# Patient Record
Sex: FEMALE | Race: WHITE | NOT HISPANIC OR LATINO | Employment: OTHER | URBAN - NONMETROPOLITAN AREA
[De-identification: names, ages, dates, MRNs, and addresses within clinical notes are randomized per-mention and may not be internally consistent; named-entity substitution may affect disease eponyms.]

---

## 2017-04-18 ENCOUNTER — TELEPHONE (OUTPATIENT)
Dept: CARDIOLOGY | Age: 82
End: 2017-04-18

## 2017-04-26 ENCOUNTER — TELEPHONE (OUTPATIENT)
Dept: CARDIOLOGY | Age: 82
End: 2017-04-26

## 2017-04-26 ENCOUNTER — TELEPHONE (OUTPATIENT)
Dept: FAMILY MEDICINE | Age: 82
End: 2017-04-26

## 2017-08-04 ENCOUNTER — APPOINTMENT (OUTPATIENT)
Dept: GENERAL RADIOLOGY | Facility: HOSPITAL | Age: 82
DRG: 481 | End: 2017-08-04
Attending: EMERGENCY MEDICINE
Payer: MEDICARE

## 2017-08-04 ENCOUNTER — APPOINTMENT (OUTPATIENT)
Dept: CT IMAGING | Facility: HOSPITAL | Age: 82
DRG: 481 | End: 2017-08-04
Attending: EMERGENCY MEDICINE
Payer: MEDICARE

## 2017-08-04 ENCOUNTER — HOSPITAL ENCOUNTER (INPATIENT)
Facility: HOSPITAL | Age: 82
LOS: 4 days | Discharge: NURSING, REHAB OR LONG-TERM CARE | DRG: 481 | End: 2017-08-08
Attending: EMERGENCY MEDICINE | Admitting: HOSPITALIST
Payer: MEDICARE

## 2017-08-04 DIAGNOSIS — Z79.01 ANTICOAGULATED ON COUMADIN: ICD-10-CM
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
## 2017-08-04 DIAGNOSIS — S72.002A HIP FRACTURE, LEFT, CLOSED, INITIAL ENCOUNTER (HCC): Primary | ICD-10-CM

## 2017-08-04 DIAGNOSIS — I48.20 ATRIAL FIBRILLATION, CHRONIC (HCC): ICD-10-CM

## 2017-08-04 LAB
ALBUMIN SERPL-MCNC: 3.4 G/DL (ref 3.5–4.8)
ALP LIVER SERPL-CCNC: 117 U/L (ref 55–142)
ALT SERPL-CCNC: 25 U/L (ref 14–54)
APTT PPP: 28.6 SECONDS (ref 25–34)
AST SERPL-CCNC: 30 U/L (ref 15–41)
BASOPHILS # BLD AUTO: 0.03 X10(3) UL (ref 0–0.1)
BASOPHILS NFR BLD AUTO: 0.6 %
BILIRUB SERPL-MCNC: 0.8 MG/DL (ref 0.1–2)
BUN BLD-MCNC: 19 MG/DL (ref 8–20)
CALCIUM BLD-MCNC: 9 MG/DL (ref 8.3–10.3)
CHLORIDE: 104 MMOL/L (ref 101–111)
CO2: 29 MMOL/L (ref 22–32)
CREAT BLD-MCNC: 1.28 MG/DL (ref 0.55–1.02)
EOSINOPHIL # BLD AUTO: 0.68 X10(3) UL (ref 0–0.3)
EOSINOPHIL NFR BLD AUTO: 13.5 %
ERYTHROCYTE [DISTWIDTH] IN BLOOD BY AUTOMATED COUNT: 13.3 % (ref 11.5–16)
GLUCOSE BLD-MCNC: 137 MG/DL (ref 70–99)
HCT VFR BLD AUTO: 44.1 % (ref 34–50)
HGB BLD-MCNC: 15 G/DL (ref 12–16)
IMMATURE GRANULOCYTE COUNT: 0.01 X10(3) UL (ref 0–1)
IMMATURE GRANULOCYTE RATIO %: 0.2 %
INR BLD: 1.19 (ref 0.89–1.11)
LYMPHOCYTES # BLD AUTO: 1.52 X10(3) UL (ref 0.9–4)
LYMPHOCYTES NFR BLD AUTO: 30.2 %
M PROTEIN MFR SERPL ELPH: 7.1 G/DL (ref 6.1–8.3)
MCH RBC QN AUTO: 31.3 PG (ref 27–33.2)
MCHC RBC AUTO-ENTMCNC: 34 G/DL (ref 31–37)
MCV RBC AUTO: 92.1 FL (ref 81–100)
MONOCYTES # BLD AUTO: 0.42 X10(3) UL (ref 0.1–0.6)
MONOCYTES NFR BLD AUTO: 8.3 %
NEUTROPHIL ABS PRELIM: 2.37 X10 (3) UL (ref 1.3–6.7)
NEUTROPHILS # BLD AUTO: 2.37 X10(3) UL (ref 1.3–6.7)
NEUTROPHILS NFR BLD AUTO: 47.2 %
PLATELET # BLD AUTO: 205 10(3)UL (ref 150–450)
POTASSIUM SERPL-SCNC: 3.7 MMOL/L (ref 3.6–5.1)
PSA SERPL DL<=0.01 NG/ML-MCNC: 15.2 SECONDS (ref 12–14.3)
RBC # BLD AUTO: 4.79 X10(6)UL (ref 3.8–5.1)
RED CELL DISTRIBUTION WIDTH-SD: 45.3 FL (ref 35.1–46.3)
SODIUM SERPL-SCNC: 141 MMOL/L (ref 136–144)
WBC # BLD AUTO: 5 X10(3) UL (ref 4–13)

## 2017-08-04 PROCEDURE — 73700 CT LOWER EXTREMITY W/O DYE: CPT | Performed by: EMERGENCY MEDICINE

## 2017-08-04 PROCEDURE — 76377 3D RENDER W/INTRP POSTPROCES: CPT

## 2017-08-04 PROCEDURE — 99223 1ST HOSP IP/OBS HIGH 75: CPT | Performed by: HOSPITALIST

## 2017-08-04 PROCEDURE — 73501 X-RAY EXAM HIP UNI 1 VIEW: CPT | Performed by: EMERGENCY MEDICINE

## 2017-08-04 PROCEDURE — 70450 CT HEAD/BRAIN W/O DYE: CPT | Performed by: EMERGENCY MEDICINE

## 2017-08-04 PROCEDURE — 71010 XR CHEST AP PORTABLE  (CPT=71010): CPT | Performed by: EMERGENCY MEDICINE

## 2017-08-04 RX ORDER — ONDANSETRON 2 MG/ML
8 INJECTION INTRAMUSCULAR; INTRAVENOUS EVERY 6 HOURS PRN
Status: DISCONTINUED | OUTPATIENT
Start: 2017-08-04 | End: 2017-08-08

## 2017-08-04 RX ORDER — METOPROLOL SUCCINATE 25 MG/1
25 TABLET, EXTENDED RELEASE ORAL NIGHTLY
Status: DISCONTINUED | OUTPATIENT
Start: 2017-08-04 | End: 2017-08-05

## 2017-08-04 RX ORDER — HYDROMORPHONE HYDROCHLORIDE 1 MG/ML
0.4 INJECTION, SOLUTION INTRAMUSCULAR; INTRAVENOUS; SUBCUTANEOUS EVERY 2 HOUR PRN
Status: CANCELLED | OUTPATIENT
Start: 2017-08-04

## 2017-08-04 RX ORDER — DULOXETIN HYDROCHLORIDE 30 MG/1
30 CAPSULE, DELAYED RELEASE ORAL DAILY
Status: DISCONTINUED | OUTPATIENT
Start: 2017-08-04 | End: 2017-08-08

## 2017-08-04 RX ORDER — SODIUM CHLORIDE 9 MG/ML
INJECTION, SOLUTION INTRAVENOUS CONTINUOUS
Status: DISCONTINUED | OUTPATIENT
Start: 2017-08-04 | End: 2017-08-07

## 2017-08-04 RX ORDER — MORPHINE SULFATE 4 MG/ML
2 INJECTION, SOLUTION INTRAMUSCULAR; INTRAVENOUS EVERY 2 HOUR PRN
Status: DISCONTINUED | OUTPATIENT
Start: 2017-08-04 | End: 2017-08-08

## 2017-08-04 RX ORDER — TEMAZEPAM 7.5 MG/1
7.5 CAPSULE ORAL NIGHTLY PRN
Status: CANCELLED | OUTPATIENT
Start: 2017-08-04

## 2017-08-04 RX ORDER — METOPROLOL SUCCINATE 50 MG/1
50 TABLET, EXTENDED RELEASE ORAL
Status: DISCONTINUED | OUTPATIENT
Start: 2017-08-05 | End: 2017-08-05

## 2017-08-04 RX ORDER — MORPHINE SULFATE 4 MG/ML
4 INJECTION, SOLUTION INTRAMUSCULAR; INTRAVENOUS EVERY 2 HOUR PRN
Status: DISCONTINUED | OUTPATIENT
Start: 2017-08-04 | End: 2017-08-08

## 2017-08-04 RX ORDER — METOPROLOL TARTRATE 50 MG/1
50 TABLET, FILM COATED ORAL 2 TIMES DAILY
Status: CANCELLED | OUTPATIENT
Start: 2017-08-04

## 2017-08-04 RX ORDER — POTASSIUM CHLORIDE 14.9 MG/ML
20 INJECTION INTRAVENOUS ONCE
Status: COMPLETED | OUTPATIENT
Start: 2017-08-05 | End: 2017-08-05

## 2017-08-04 RX ORDER — DILTIAZEM HYDROCHLORIDE 120 MG/1
120 CAPSULE, EXTENDED RELEASE ORAL DAILY
Status: DISCONTINUED | OUTPATIENT
Start: 2017-08-05 | End: 2017-08-06

## 2017-08-04 RX ORDER — ONDANSETRON 2 MG/ML
4 INJECTION INTRAMUSCULAR; INTRAVENOUS EVERY 4 HOURS PRN
Status: DISCONTINUED | OUTPATIENT
Start: 2017-08-04 | End: 2017-08-04

## 2017-08-04 RX ORDER — HYDROMORPHONE HYDROCHLORIDE 1 MG/ML
0.8 INJECTION, SOLUTION INTRAMUSCULAR; INTRAVENOUS; SUBCUTANEOUS EVERY 2 HOUR PRN
Status: CANCELLED | OUTPATIENT
Start: 2017-08-04

## 2017-08-04 RX ORDER — ACETAMINOPHEN 325 MG/1
650 TABLET ORAL EVERY 6 HOURS PRN
Status: DISCONTINUED | OUTPATIENT
Start: 2017-08-04 | End: 2017-08-08

## 2017-08-04 RX ORDER — GUAIFENESIN 600 MG
600 TABLET, EXTENDED RELEASE 12 HR ORAL ONCE
Status: COMPLETED | OUTPATIENT
Start: 2017-08-04 | End: 2017-08-05

## 2017-08-04 RX ORDER — HYDROMORPHONE HYDROCHLORIDE 1 MG/ML
0.5 INJECTION, SOLUTION INTRAMUSCULAR; INTRAVENOUS; SUBCUTANEOUS EVERY 30 MIN PRN
Status: DISCONTINUED | OUTPATIENT
Start: 2017-08-04 | End: 2017-08-04

## 2017-08-04 RX ORDER — HYDROMORPHONE HYDROCHLORIDE 1 MG/ML
0.2 INJECTION, SOLUTION INTRAMUSCULAR; INTRAVENOUS; SUBCUTANEOUS EVERY 2 HOUR PRN
Status: CANCELLED | OUTPATIENT
Start: 2017-08-04

## 2017-08-04 RX ORDER — ONDANSETRON 2 MG/ML
4 INJECTION INTRAMUSCULAR; INTRAVENOUS ONCE
Status: COMPLETED | OUTPATIENT
Start: 2017-08-04 | End: 2017-08-04

## 2017-08-04 RX ORDER — LATANOPROST 50 UG/ML
1 SOLUTION/ DROPS OPHTHALMIC NIGHTLY
Status: DISCONTINUED | OUTPATIENT
Start: 2017-08-04 | End: 2017-08-08

## 2017-08-04 RX ORDER — MORPHINE SULFATE 4 MG/ML
4 INJECTION, SOLUTION INTRAMUSCULAR; INTRAVENOUS ONCE
Status: COMPLETED | OUTPATIENT
Start: 2017-08-04 | End: 2017-08-04

## 2017-08-04 NOTE — ED INITIAL ASSESSMENT (HPI)
Pt to er per ems after fall. Pt was walking out of store and bent down to smell flowers. Pt fell onto left side. Daughter witnessed fall. Denies hitting head.   Pt on coumadin

## 2017-08-05 ENCOUNTER — APPOINTMENT (OUTPATIENT)
Dept: GENERAL RADIOLOGY | Facility: HOSPITAL | Age: 82
DRG: 481 | End: 2017-08-05
Attending: ORTHOPAEDIC SURGERY
Payer: MEDICARE

## 2017-08-05 ENCOUNTER — ANESTHESIA (OUTPATIENT)
Dept: SURGERY | Facility: HOSPITAL | Age: 82
End: 2017-08-05

## 2017-08-05 ENCOUNTER — SURGERY (OUTPATIENT)
Age: 82
End: 2017-08-05

## 2017-08-05 ENCOUNTER — ANESTHESIA EVENT (OUTPATIENT)
Dept: SURGERY | Facility: HOSPITAL | Age: 82
End: 2017-08-05

## 2017-08-05 ENCOUNTER — APPOINTMENT (OUTPATIENT)
Dept: GENERAL RADIOLOGY | Facility: HOSPITAL | Age: 82
DRG: 481 | End: 2017-08-05
Attending: INTERNAL MEDICINE
Payer: MEDICARE

## 2017-08-05 ENCOUNTER — APPOINTMENT (OUTPATIENT)
Dept: CV DIAGNOSTICS | Facility: HOSPITAL | Age: 82
DRG: 481 | End: 2017-08-05
Attending: INTERNAL MEDICINE
Payer: MEDICARE

## 2017-08-05 PROBLEM — E03.9 HYPOTHYROIDISM: Chronic | Status: ACTIVE | Noted: 2017-08-05

## 2017-08-05 LAB
ALLENS TEST: POSITIVE
ANTIBODY SCREEN: NEGATIVE
ARTERIAL BLD GAS O2 SATURATION: 95 % (ref 92–100)
ARTERIAL BLOOD GAS BASE EXCESS: -0.9
ARTERIAL BLOOD GAS HCO3: 23 MEQ/L (ref 22–26)
ARTERIAL BLOOD GAS PCO2: 36 MM HG (ref 35–45)
ARTERIAL BLOOD GAS PH: 7.42 (ref 7.35–7.45)
ARTERIAL BLOOD GAS PO2: 85 MM HG (ref 80–105)
ATRIAL RATE: 129 BPM
BASOPHILS # BLD AUTO: 0.03 X10(3) UL (ref 0–0.1)
BASOPHILS NFR BLD AUTO: 0.3 %
BILIRUB UR QL STRIP.AUTO: NEGATIVE
BUN BLD-MCNC: 16 MG/DL (ref 8–20)
CALCIUM BLD-MCNC: 8.9 MG/DL (ref 8.3–10.3)
CALCULATED O2 SATURATION: 97 % (ref 92–100)
CARBOXYHEMOGLOBIN: 1.7 % SAT (ref 0–3)
CHLORIDE: 110 MMOL/L (ref 101–111)
CLARITY UR REFRACT.AUTO: CLEAR
CO2: 27 MMOL/L (ref 22–32)
COLOR UR AUTO: YELLOW
CREAT BLD-MCNC: 1.02 MG/DL (ref 0.55–1.02)
EOSINOPHIL # BLD AUTO: 0.06 X10(3) UL (ref 0–0.3)
EOSINOPHIL NFR BLD AUTO: 0.6 %
ERYTHROCYTE [DISTWIDTH] IN BLOOD BY AUTOMATED COUNT: 13.5 % (ref 11.5–16)
GLUCOSE BLD-MCNC: 131 MG/DL (ref 70–99)
GLUCOSE UR STRIP.AUTO-MCNC: NEGATIVE MG/DL
HCT VFR BLD AUTO: 42.5 % (ref 34–50)
HGB BLD-MCNC: 13.8 G/DL (ref 12–16)
HYALINE CASTS #/AREA URNS AUTO: PRESENT /LPF
IMMATURE GRANULOCYTE COUNT: 0.06 X10(3) UL (ref 0–1)
IMMATURE GRANULOCYTE RATIO %: 0.6 %
KETONES UR STRIP.AUTO-MCNC: NEGATIVE MG/DL
L/M: 4 L/MIN
LEUKOCYTE ESTERASE UR QL STRIP.AUTO: NEGATIVE
LYMPHOCYTES # BLD AUTO: 0.59 X10(3) UL (ref 0.9–4)
LYMPHOCYTES NFR BLD AUTO: 6 %
MCH RBC QN AUTO: 31.2 PG (ref 27–33.2)
MCHC RBC AUTO-ENTMCNC: 32.5 G/DL (ref 31–37)
MCV RBC AUTO: 95.9 FL (ref 81–100)
METHEMOGLOBIN: 0.6 % SAT (ref 0.4–1.5)
MONOCYTES # BLD AUTO: 0.67 X10(3) UL (ref 0.1–0.6)
MONOCYTES NFR BLD AUTO: 6.8 %
NEUTROPHIL ABS PRELIM: 8.5 X10 (3) UL (ref 1.3–6.7)
NEUTROPHILS # BLD AUTO: 8.5 X10(3) UL (ref 1.3–6.7)
NEUTROPHILS NFR BLD AUTO: 85.7 %
NITRITE UR QL STRIP.AUTO: NEGATIVE
PATIENT TEMPERATURE: 98.9 F
PH UR STRIP.AUTO: 7 [PH] (ref 4.5–8)
PLATELET # BLD AUTO: 145 10(3)UL (ref 150–450)
POTASSIUM SERPL-SCNC: 4.2 MMOL/L (ref 3.6–5.1)
POTASSIUM SERPL-SCNC: 4.3 MMOL/L (ref 3.6–5.1)
PROT UR STRIP.AUTO-MCNC: 30 MG/DL
Q-T INTERVAL: 338 MS
QRS DURATION: 100 MS
QTC CALCULATION (BEZET): 440 MS
R AXIS: 78 DEGREES
RBC # BLD AUTO: 4.43 X10(6)UL (ref 3.8–5.1)
RBC UR QL AUTO: NEGATIVE
RED CELL DISTRIBUTION WIDTH-SD: 47.9 FL (ref 35.1–46.3)
RH BLOOD TYPE: NEGATIVE
SODIUM SERPL-SCNC: 144 MMOL/L (ref 136–144)
SP GR UR STRIP.AUTO: 1.02 (ref 1–1.03)
T AXIS: 57 DEGREES
TOTAL HEMOGLOBIN: 12.2 G/DL (ref 11.7–16)
UROBILINOGEN UR STRIP.AUTO-MCNC: <2 MG/DL
VENTRICULAR RATE: 102 BPM
WBC # BLD AUTO: 9.9 X10(3) UL (ref 4–13)

## 2017-08-05 PROCEDURE — 93306 TTE W/DOPPLER COMPLETE: CPT | Performed by: INTERNAL MEDICINE

## 2017-08-05 PROCEDURE — 71010 XR CHEST AP PORTABLE  (CPT=71010): CPT | Performed by: INTERNAL MEDICINE

## 2017-08-05 PROCEDURE — 3E0T3CZ INTRODUCTION OF REGIONAL ANESTHETIC INTO PERIPHERAL NERVES AND PLEXI, PERCUTANEOUS APPROACH: ICD-10-PCS | Performed by: ANESTHESIOLOGY

## 2017-08-05 PROCEDURE — 76001 XR FLUOROSCOPE EXAM >1 HR EXTENSIVE (CPT=76001): CPT | Performed by: ORTHOPAEDIC SURGERY

## 2017-08-05 PROCEDURE — 99232 SBSQ HOSP IP/OBS MODERATE 35: CPT | Performed by: INTERNAL MEDICINE

## 2017-08-05 PROCEDURE — 0QS706Z REPOSITION LEFT UPPER FEMUR WITH INTRAMEDULLARY INTERNAL FIXATION DEVICE, OPEN APPROACH: ICD-10-PCS | Performed by: ORTHOPAEDIC SURGERY

## 2017-08-05 DEVICE — SCREW CORT FA 5.0X35 Z NAIL: Type: IMPLANTABLE DEVICE | Site: HIP | Status: FUNCTIONAL

## 2017-08-05 DEVICE — ZNN CMN NAIL 10MMX21.5CM 125L
Type: IMPLANTABLE DEVICE | Site: HIP | Status: FUNCTIONAL
Brand: ZIMMER® NATURAL NAIL® SYSTEM

## 2017-08-05 DEVICE — ZNN CMN LAG SCREW 10.5X95
Type: IMPLANTABLE DEVICE | Site: HIP | Status: FUNCTIONAL
Brand: ZIMMER® NATURAL NAIL® SYSTEM

## 2017-08-05 RX ORDER — CEFAZOLIN SODIUM 1 G/3ML
INJECTION, POWDER, FOR SOLUTION INTRAMUSCULAR; INTRAVENOUS
Status: DISCONTINUED | OUTPATIENT
Start: 2017-08-05 | End: 2017-08-05 | Stop reason: HOSPADM

## 2017-08-05 RX ORDER — SENNOSIDES 8.6 MG
17.2 TABLET ORAL NIGHTLY
Status: DISCONTINUED | OUTPATIENT
Start: 2017-08-05 | End: 2017-08-08

## 2017-08-05 RX ORDER — METOPROLOL SUCCINATE 50 MG/1
50 TABLET, EXTENDED RELEASE ORAL
Status: DISCONTINUED | OUTPATIENT
Start: 2017-08-05 | End: 2017-08-08

## 2017-08-05 RX ORDER — ONDANSETRON 2 MG/ML
4 INJECTION INTRAMUSCULAR; INTRAVENOUS EVERY 6 HOURS PRN
Status: DISCONTINUED | OUTPATIENT
Start: 2017-08-05 | End: 2017-08-08

## 2017-08-05 RX ORDER — DILTIAZEM HYDROCHLORIDE 5 MG/ML
5 INJECTION INTRAVENOUS EVERY 4 HOURS PRN
Status: DISCONTINUED | OUTPATIENT
Start: 2017-08-05 | End: 2017-08-08

## 2017-08-05 RX ORDER — SODIUM CHLORIDE AND POTASSIUM CHLORIDE .9; .15 G/100ML; G/100ML
SOLUTION INTRAVENOUS CONTINUOUS
Status: DISCONTINUED | OUTPATIENT
Start: 2017-08-05 | End: 2017-08-05

## 2017-08-05 RX ORDER — METOPROLOL TARTRATE 50 MG/1
50 TABLET, FILM COATED ORAL
Status: DISCONTINUED | OUTPATIENT
Start: 2017-08-05 | End: 2017-08-05

## 2017-08-05 NOTE — ANESTHESIA POSTPROCEDURE EVALUATION
[address] Patient Status:  Inpatient   Age/Gender 80year old female MRN [id number]   Location [facility] Attending Julio Ray MD   Hosp Day # 1 PCP Kerrie Romero MD       Anesthesia Post-op Note

## 2017-08-05 NOTE — PROGRESS NOTES
Pt went straight to ICU from PACU. Spoke with Curt Gutiérrez RN at this time. Tubed pts meds. All pts belongings brought up to pts room 472.

## 2017-08-05 NOTE — PROGRESS NOTES
[facility]  Progress Note    Doris Ugarte Patient Status:  Inpatient    1929 MRN [id number]   [facility] 3SW-A Attending Janet Mcqueen MD   Hosp Day # 1 PCP Andrew Dolan MD     CC:  Left hip pain s/p fall at home    [address]  08/04/2017   CA 9.0 08/04/2017   ALB 3.4 08/04/2017   ALKPHO 117 08/04/2017   BILT 0.8 08/04/2017   TP 7.1 08/04/2017   AST 30 08/04/2017   ALT 25 08/04/2017   PTT 28.6 08/04/2017   INR 1.19 08/04/2017   PTP 15.2 08/04/2017       Imaging:  CT HIP Daily   ondansetron HCl (ZOFRAN) injection 8 mg 8 mg Intravenous Q6H PRN   acetaminophen (TYLENOL) tab 650 mg 650 mg Oral Q6H PRN   diltiazem (CARDIZEM CD) 24 hr cap 120 mg 120 mg Oral Daily   morphINE sulfate (PF) 4 MG/ML injection 2 mg 2 mg Intravenous Q

## 2017-08-05 NOTE — PROGRESS NOTES
ICU  Critical Care APN Progress Note    NAME: Chelsey Lamar - ROOM: 103/264-F - MRN: [id number] - Age: 80year old - :1929    History Of Present Illness:  Chelsey Lamar is a 80year old female with PMHx significant for Afib on coumadin, CAD lesions, warm and dry  Neurologic: CNII-XII intact, normal strength    Data this admission:    CT hip 8/4: CONCLUSION:  Comminuted, displaced, angulated fractures involving the intertrochanteric region of the proximal left femur and base of the left femora

## 2017-08-05 NOTE — ANESTHESIA PREPROCEDURE EVALUATION
PRE-OP EVALUATION    Patient Name: Art Locke    Pre-op Diagnosis: Hip fracture ([address] Halie Johnson    Procedure(s):  Left hip intramedullary rodding    Surgeon(s) and Role:     Staci Tena MD - Primary    Pre-op vitals reviewed. Temp: 98.2 °F (36. Take 90 mg by mouth daily. Disp:  Rfl:    aspirin 81 MG Oral Tab Take 81 mg by mouth daily. Disp:  Rfl:    DULoxetine HCl 30 MG Oral Cap DR Particles Take 30 mg by mouth daily. Disp:  Rfl:    latanoprost 0.005 % Ophthalmic Solution nightly.  Disp:  Rfl: 4.3 08/05/2017    08/04/2017   CO2 29.0 08/04/2017   BUN 19 08/04/2017   CREATSERUM 1.28 (H) 08/04/2017    (H) 08/04/2017   CA 9.0 08/04/2017       Lab Results  Component Value Date   INR 1.19 (H) 08/04/2017         Airway      Mallampati: II

## 2017-08-05 NOTE — PHYSICAL THERAPY NOTE
Chart reviewed. Attempted to see patient this am but patient in OR.  Will await new orders after surgery prior to initiating PT eval.

## 2017-08-05 NOTE — CONSULTS
[facility]  Report of Consultation    Cheryl Amanda Patient Status:  Inpatient    1929 MRN [id number]   [facility] 3SW-A Attending Aric Richards MD   Hosp Day # 1 PCP Jeff Galeano MD     Reason drop, Both Eyes, Nightly  •  DULoxetine HCl (CYMBALTA) DR particles cap 30 mg, 30 mg, Oral, Daily  •  ondansetron HCl (ZOFRAN) injection 8 mg, 8 mg, Intravenous, Q6H PRN  •  acetaminophen (TYLENOL) tab 650 mg, 650 mg, Oral, Q6H PRN  •  Metoprolol Succinate 08/04/2017       BUN (mg/dL)   Date Value   08/04/2017 19   ----------  Creatinine (mg/dL)   Date Value   08/04/2017 1.28 (H)   ----------    Lab Results  Component Value Date   INR 1.19 (H) 08/04/2017         Casandra Daley  8/5/2017  12:23 AM

## 2017-08-05 NOTE — PROGRESS NOTES
Spoke with Dr. Christopher Miguel with Cardiology. ORder received for PRN cardizem IV for HR > 100. As of now Dr. Christopher Miguel not able to view echo results. Results pending. Awaiting cardiac clearance at this time.

## 2017-08-05 NOTE — ED PROVIDER NOTES
Patient Seen in: [facility] Emergency Department    History   Patient presents with:  Fall (musculoskeletal, neurologic)    Stated Complaint: fall    HPI    80-year-old female presents to the emergency department after a fall.   Patient was outside o Smoking status: Former Smoker                                                              Packs/day: 0.00      Years: 0.00          Review of Systems   All other systems reviewed and are negative.       Positive for stated complaint: fall  Other systems ar Skin: Skin is warm and dry. No pallor. Psychiatric: She has a normal mood and affect. Nursing note and vitals reviewed.            ED Course     Labs Reviewed   COMP METABOLIC PANEL (14) - Abnormal; Notable for the following:        Result Value    Gluc Patient had IV established and blood work obtained. She initially was refusing pain medications but was also refusing having her clothing removed. Ultimately she did accept taking some pain medications and were able to undress her.   She went over for x-r

## 2017-08-05 NOTE — OPERATIVE REPORT
PRE-OP DX:  LEFT INTERTROCHANTERIC HIP FRACTURE  POST-OP DX:  SAME  PROCEDURE: LEFT FEMUR INTRAMEDULLARY FIXATION  SURGEON:  Low Armenta MD  FIRST ASSIST: NONE  ANESTHESIA:  GENERAL  MOT:437YT  COMPLICATIONS:  NONE  SPECIMEN:  NONE  DRAIN: NONE   IMPLANT U DRESSING WAS APPLIED. ALL COUNTS WERE CORRECT.     Lita Gill MD

## 2017-08-05 NOTE — CONSULTS
[facility]  Report of Consultation    Sridhar Rajput Patient Status:  Inpatient    1929 MRN [id number]   [facility] 4SW-A Attending Radha Trevizo MD   Hosp Day # 1 PCP Gisselle Lal MD     Reason for Consultation:  Post-op hy solution 1 drop, 1 drop, Both Eyes, Nightly  •  DULoxetine HCl (CYMBALTA) DR particles cap 30 mg, 30 mg, Oral, Daily  •  [MAR Hold] ondansetron HCl (ZOFRAN) injection 8 mg, 8 mg, Intravenous, Q6H PRN  •  [MAR Hold] acetaminophen (TYLENOL) tab 650 mg, 650 m Weight   08/05/17 1800 126/61 - - 104 16 95 % - -   08/05/17 1700 133/64 - - 101 18 97 % - -   08/05/17 1600 134/86 98.9 °F (37.2 °C) Temporal 112 14 97 % - -   08/05/17 1500 129/67 - - 102 14 95 % - -   08/05/17 1454 133/73 98.4 °F (36.9 °C) - 108 11 96 % 145.0*       Recent Labs   Lab  08/04/17   1907  08/05/17   0507  08/05/17   1634   NA  141   --   144   K  3.7  4.3  4.2   CL  104   --   110   CO2  29.0   --   27.0   BUN  19   --   16   ALT  25   --    --    AST  30   --    --        No results for inpu

## 2017-08-05 NOTE — CONSULTS
[address] [facility] Patient Status:  Inpatient    1929 MRN [id number]   [facility] 3SW-A Attending Jeffery Spring MD   Hosp Day # 1 PCP Roz Osorio MD     History of Present Illness:  [address] [place] [address] Rfl:  8/4/2017 at 0900   aspirin 81 MG Oral Tab Take 81 mg by mouth daily. Disp:  Rfl:  8/3/2017 at 2100   DULoxetine HCl 30 MG Oral Cap DR Particles Take 30 mg by mouth daily.  Disp:  Rfl:  8/3/2017 at 2100   latanoprost 0.005 % Ophthalmic Solution nightly options were discussed. What intertorchanteric hip fracture means was explained. Operative vs nonoperative treatment options were explained. Prognosis with and without surgery explained. I would recommend IM fixation.   Risks and complications includi

## 2017-08-05 NOTE — PLAN OF CARE
CARDIOVASCULAR - ADULT    • Absence of cardiac arrhythmias or at baseline Not Progressing          PAIN - ADULT    • Verbalizes/displays adequate comfort level or patient's stated pain goal Progressing        Patient/Family Goals    • Patient/Family Short

## 2017-08-06 PROBLEM — R09.02 HYPOXIA: Status: ACTIVE | Noted: 2017-08-06

## 2017-08-06 PROBLEM — J98.11 ATELECTASIS: Status: ACTIVE | Noted: 2017-08-06

## 2017-08-06 LAB
BUN BLD-MCNC: 15 MG/DL (ref 8–20)
CALCIUM BLD-MCNC: 8.2 MG/DL (ref 8.3–10.3)
CHLORIDE: 109 MMOL/L (ref 101–111)
CO2: 25 MMOL/L (ref 22–32)
CREAT BLD-MCNC: 0.8 MG/DL (ref 0.55–1.02)
ERYTHROCYTE [DISTWIDTH] IN BLOOD BY AUTOMATED COUNT: 13.4 % (ref 11.5–16)
GLUCOSE BLD-MCNC: 110 MG/DL (ref 70–99)
HAV IGM SER QL: 1.8 MG/DL (ref 1.7–3)
HCT VFR BLD AUTO: 34.1 % (ref 34–50)
HGB BLD-MCNC: 11.2 G/DL (ref 12–16)
MCH RBC QN AUTO: 31.5 PG (ref 27–33.2)
MCHC RBC AUTO-ENTMCNC: 32.8 G/DL (ref 31–37)
MCV RBC AUTO: 95.8 FL (ref 81–100)
PHOSPHATE SERPL-MCNC: 2.1 MG/DL (ref 2.5–4.9)
PLATELET # BLD AUTO: 163 10(3)UL (ref 150–450)
POTASSIUM SERPL-SCNC: 4 MMOL/L (ref 3.6–5.1)
RBC # BLD AUTO: 3.56 X10(6)UL (ref 3.8–5.1)
RED CELL DISTRIBUTION WIDTH-SD: 47.4 FL (ref 35.1–46.3)
SODIUM SERPL-SCNC: 141 MMOL/L (ref 136–144)
WBC # BLD AUTO: 10.6 X10(3) UL (ref 4–13)

## 2017-08-06 PROCEDURE — 99232 SBSQ HOSP IP/OBS MODERATE 35: CPT | Performed by: INTERNAL MEDICINE

## 2017-08-06 RX ORDER — DILTIAZEM HYDROCHLORIDE 180 MG/1
180 CAPSULE, EXTENDED RELEASE ORAL DAILY
Status: DISCONTINUED | OUTPATIENT
Start: 2017-08-07 | End: 2017-08-07

## 2017-08-06 RX ORDER — HYDROCODONE BITARTRATE AND ACETAMINOPHEN 5; 325 MG/1; MG/1
2 TABLET ORAL EVERY 4 HOURS PRN
Status: DISCONTINUED | OUTPATIENT
Start: 2017-08-06 | End: 2017-08-08

## 2017-08-06 RX ORDER — HYDROCODONE BITARTRATE AND ACETAMINOPHEN 5; 325 MG/1; MG/1
1 TABLET ORAL EVERY 4 HOURS PRN
Status: DISCONTINUED | OUTPATIENT
Start: 2017-08-06 | End: 2017-08-08

## 2017-08-06 RX ORDER — MAGNESIUM OXIDE 400 MG (241.3 MG MAGNESIUM) TABLET
400 TABLET ONCE
Status: COMPLETED | OUTPATIENT
Start: 2017-08-06 | End: 2017-08-06

## 2017-08-06 RX ORDER — DOCUSATE SODIUM 100 MG/1
100 CAPSULE, LIQUID FILLED ORAL 2 TIMES DAILY
Status: DISCONTINUED | OUTPATIENT
Start: 2017-08-06 | End: 2017-08-08

## 2017-08-06 RX ORDER — BISACODYL 10 MG
10 SUPPOSITORY, RECTAL RECTAL
Status: DISCONTINUED | OUTPATIENT
Start: 2017-08-06 | End: 2017-08-08

## 2017-08-06 NOTE — PROGRESS NOTES
Orthopedic surgery progress note    Jonathan Martínez Patient Status:  Inpatient    1929 MRN [id number]   Location [address] [place] [address] 4SW-A Attending Sunny Hurd MD   Hosp Day # 2 PCP Rama Lerma MD       Subjective:  Left hip pain as expected.   No

## 2017-08-06 NOTE — PROGRESS NOTES
[facility]  Progress Note    Jonathan Martínez Patient Status:  Inpatient    1929 MRN [id number]   [facility] 3SW-A Attending Sunny Hurd MD   Hosp Day # 2 PCP Rama Lerma MD     CC:  Left hip pain s/p fall at home    [address] 08/06/2017   MG 1.8 08/06/2017   PHOS 2.1 08/06/2017       Imaging:  CT HIP(BONE) LEFT (CPT=73700)     COMPARISON:  None.      INDICATIONS:  fall     TECHNIQUE:  Multi-planar CT images were created without intravenous contrast. Shaded surface renderings are 10 mg 10 mg Oral Q24H   Senna (SENOKOT) tab TABS 17.2 mg 17.2 mg Oral Nightly   ondansetron HCl (ZOFRAN) injection 4 mg 4 mg Intravenous Q6H PRN   thyroid (ARMOUR) tab 90 mg 90 mg Oral Daily   latanoprost (XALATAN) 0.005 % ophthalmic solution 1 drop 1 drop orthopedics floor today        Jason Wills MD  8/6/2017

## 2017-08-06 NOTE — PROGRESS NOTES
[facility]  Progress Note    [facility] Patient Status:  Inpatient    1929 MRN [id number]   Location [address] [place] [address] 4SW-A Attending Baltazar Ni MD   Hosp Day # 2 PCP Jj Loomis MD     Subjective:  Patient resting comfortably aft tab 1 tablet 1 tablet Oral Q4H PRN   Or      HYDROcodone-acetaminophen (NORCO) 5-325 MG per tab 2 tablet 2 tablet Oral Q4H PRN   [START ON 8/7/2017] diltiazem (CARDIZEM CD) 24 hr cap 180 mg 180 mg Oral Daily   Metoprolol Succinate ER (Toprol XL) 24 hr tab

## 2017-08-06 NOTE — PHYSICAL THERAPY NOTE
PHYSICAL THERAPY EVALUATION - INPATIENT     Room Number: 472/472-A  Evaluation Date: 8/6/2017  Type of Evaluation: Initial  Physician Order: PT Eval and Treat    Presenting Problem: L hip fx s/p IM smith 8/5/17  Reason for Therapy: Mobility Dysfunction a extremity           L Lower Extremity: Partial Weight Bearing 50%    PAIN ASSESSMENT  Rating: Unable to rate  Location: L leg/hip  Management Techniques: Breathing techniques    COGNITION  · Overall Cognitive Status:  Impaired  · Arousal/Alertness:  delaye Standardized Score (AM-PAC Scale): 28.58   CMS Modifier (G-Code): CM    FUNCTIONAL ABILITY STATUS  Gait Assessment   Gait Assistance: Not tested           Stoop/Curb Assistance: Not tested       Skilled Therapy Provided: Pt received sleeping in bed.   Eas mobility, transfers, and amb. The patient is below her baseline and would benefit from skilled inpatient PT to address the above deficits to assist patient in returning to prior level of function.     Level of complexity is low for PT eval.    DISCHARGE RE

## 2017-08-06 NOTE — PROGRESS NOTES
Post Op Day 1 Ortho Note    Status Post Nerve Block:  Type of Nerve Block: Left Fascia Iliaca  Single Injection Nerve Block    Post op review: No evidence of immediate block related complications, No paresthesia noted, Able to lift leg(s), Able to planta

## 2017-08-06 NOTE — PROGRESS NOTES
Received from ICU. Alert to self/place, forgetful to time/situation and reoriented easily. Hard of hearing, no hearing aides noted. States painful to left hip after transfer, Tylenol given. Left hip aquacell dressing clean/dry/intact, ice pack applied.  Tel

## 2017-08-06 NOTE — PROGRESS NOTES
[facility]  Progress Note    Bridget Martinez Patient Status:  Inpatient    1929 MRN [id number]   [facility] 4SW-A Attending Ramesh Qureshi MD   Hosp Day # 2 PCP Jason Brenner MD     Subjective:  Bridget Martinez is a(n) 80 year --    --    --        Recent Labs   Lab  08/06/17   0421   MG  1.8         Lab Results  Component Value Date   PHOS 2.1 (L) 08/06/2017        Recent Labs   Lab  08/04/17   1907   INR  1.19*   PTT  28.6       Recent Labs   Lab  08/05/17 2007   ABGPHT  7.4

## 2017-08-06 NOTE — PROGRESS NOTES
Spoke with Dr. Krissy Escobar regarding resuming Coumadin. States will switch patient tomorrow, continue Xarelto for now.

## 2017-08-06 NOTE — PLAN OF CARE
CARDIOVASCULAR - ADULT    • Absence of cardiac arrhythmias or at baseline Progressing        PAIN - ADULT    • Verbalizes/displays adequate comfort level or patient's stated pain goal Progressing        SAFETY ADULT - FALL    • Free from fall injury Progre

## 2017-08-07 LAB
ERYTHROCYTE [DISTWIDTH] IN BLOOD BY AUTOMATED COUNT: 13.5 % (ref 11.5–16)
HAV IGM SER QL: 2 MG/DL (ref 1.7–3)
HCT VFR BLD AUTO: 32.6 % (ref 34–50)
HGB BLD-MCNC: 10.4 G/DL (ref 12–16)
MCH RBC QN AUTO: 31.4 PG (ref 27–33.2)
MCHC RBC AUTO-ENTMCNC: 31.9 G/DL (ref 31–37)
MCV RBC AUTO: 98.5 FL (ref 81–100)
PLATELET # BLD AUTO: 138 10(3)UL (ref 150–450)
RBC # BLD AUTO: 3.31 X10(6)UL (ref 3.8–5.1)
RED CELL DISTRIBUTION WIDTH-SD: 48.4 FL (ref 35.1–46.3)
WBC # BLD AUTO: 8.6 X10(3) UL (ref 4–13)

## 2017-08-07 PROCEDURE — 99232 SBSQ HOSP IP/OBS MODERATE 35: CPT | Performed by: INTERNAL MEDICINE

## 2017-08-07 RX ORDER — POLYETHYLENE GLYCOL 3350 17 G/17G
17 POWDER, FOR SOLUTION ORAL DAILY PRN
Status: DISCONTINUED | OUTPATIENT
Start: 2017-08-07 | End: 2017-08-08

## 2017-08-07 RX ORDER — DILTIAZEM HYDROCHLORIDE 240 MG/1
240 CAPSULE, COATED, EXTENDED RELEASE ORAL DAILY
Status: DISCONTINUED | OUTPATIENT
Start: 2017-08-08 | End: 2017-08-08

## 2017-08-07 RX ORDER — FUROSEMIDE 10 MG/ML
20 INJECTION INTRAMUSCULAR; INTRAVENOUS ONCE
Status: COMPLETED | OUTPATIENT
Start: 2017-08-07 | End: 2017-08-07

## 2017-08-07 NOTE — CM/SW NOTE
Spoke with pt's dtr, Ana Luisa Pino, re: [address] [phone] in [place]. She knows this facility and is fine with plan for rehab there. SW noted that facility is reviewing and that [facility] Medicare Adv will need to give auth to admit.

## 2017-08-07 NOTE — PLAN OF CARE
PAIN - ADULT    • Verbalizes/displays adequate comfort level or patient's stated pain goal Progressing        RESPIRATORY - ADULT    • Achieves optimal ventilation and oxygenation Progressing        Pt continues to desat off O2.   Currently at [facility] to [facility]

## 2017-08-07 NOTE — PHYSICAL THERAPY NOTE
PHYSICAL THERAPY TREATMENT NOTE - INPATIENT    Room Number: 367/367-A     Session: 1   Number of Visits to Meet Established Goals: 5    Presenting Problem: L hip fx s/p IM smith 8/5/17     History related to current admission: Pt admitted from home after fa adjusting bedclothes, sheets and blankets)?: A Lot   -   Sitting down on and standing up from a chair with arms (e.g., wheelchair, bedside commode, etc.): A Lot   -   Moving from lying on back to sitting on the side of the bed?: A Lot   How much help from questions and concerns addressed    ASSESSMENT   Patient is a 80year old female admitted 8/4/2017 for L hip fx. Results of the AM-PAC \"6 clicks\" Inpatient Daily Mobility Short Form for the patient is 76.75% degree of basic mobility impairment.   The pa

## 2017-08-07 NOTE — PAYOR COMM NOTE
--------------  ADMISSION REVIEW     Payor: [employer] MEDICARE ADV PPO  Subscriber #:  [id number]  Authorization Number: N/A    Admit date: 8/4/17  Admit time: 2238       Admitting Physician: Elaina Mitchell MD  Attending Physician:  Joan Marshall MD  [facility] Warfarin Sodium 3 MG Oral Tab,  Take 3 mg by mouth daily. Cholecalciferol (VITAMIN D3) 11417 UNITS Oral Cap,  Take by mouth.     Family History   Problem Relation Age of Onset   • Thyroid disease Other    • Ear Problems Other    • Allergies Other      Re Left hip: She exhibits[LM.1] decreased range of motion[LM.3],[LM.1] tenderness[LM.3] and[LM.1] bony tenderness[LM.3]. Arms:[LM.1]  Patient is keeping legs flexed and difficult to assess for shortening.   She has pain with extension she has some Patient had IV established and blood work obtained. She initially was refusing pain medications but was also refusing having her clothing removed. Ultimately she did accept taking some pain medications and were able to undress her.   She went over for x-r History of Present Illness:[PT.1] Moraima Finley[PT. 2] is a[PT.3 80year old[PT.2] female with Atrial fibrillation on Coumadin, CAD sp PCI,[PT.1] Essential hypertension, Dyslipidemia,[PT.3] Hypothyroidism and GERD who presents to the ER after a fall. [P CREATSERUM  1.28*   CA  9.0   ALB  3.4*   NA  141   K  3.7   CL  104   CO2  29.0   ALKPHO  117   AST  30   ALT  25   BILT  0.8   TP  7.1   Estimated Creatinine Clearance: 26.2 mL/min (based on SCr of 1.28 mg/dL).   Recent Labs   Lab  08/04/17   1907   PTP Patient’s diagnosis and treatment options were discussed. What intertorchanteric hip fracture means was explained. Operative vs nonoperative treatment options were explained. Prognosis with and without surgery explained.    I would recommend IM fixation rivaroxaban (XARELTO) tab 10 mg     Date Action Dose Route User    8/7/2017 0540 Given 10 mg Oral Tanvi Real, RN      sodium phosphate 15 mmol in sodium chloride 0.9 % 100 mL IVPB  83cc/h    Date Action Dose Route User    8/6/2017 1443 New Bag 15 mm

## 2017-08-07 NOTE — PROGRESS NOTES
[facility]  Progress Note    Clint Davis Patient Status:  Inpatient    1929 MRN [id number]   [facility] 3SW-A Attending Jason Abraham MD   Hosp Day # 3 PCP Girma Barboza MD     Assessment:  · Post-op hypoxemia  · ? COPD- f distress  Heart: Regular rate and rhythm, normal S1S2  Lungs: very diminished bilat   Abdomen: soft, nontender, no guarding, no rebound, positive BS  Extremity no lower extremity edema, no cyanosis  Neurological:, no new focal deficits    Lab Data Review: Metoprolol Succinate ER (Toprol XL) 24 hr tab 50 mg 50 mg Oral 2x Daily(Beta Blocker)   DilTIAZem HCl (CARDIZEM) injection 5 mg 5 mg Intravenous Q4H PRN   Senna (SENOKOT) tab TABS 17.2 mg 17.2 mg Oral Nightly   ondansetron HCl (ZOFRAN) injection 4 mg 4 m

## 2017-08-07 NOTE — PROGRESS NOTES
[facility]  Progress Note    Tabatha Barba Patient Status:  Inpatient    1929 MRN [id number]   [facility] 3SW-A Attending Augusto Reardon MD   Hosp Day # 3 PCP Misty Castro MD     CC:  Left hip pain s/p fall at home    [address] were created without intravenous contrast. Shaded surface renderings are generated on an independent CT scanner workstation. Dose reduction techniques were used.  Dose information is transmitted to the [facility]) Santiago Bean [address] [place] [address] (CARDIZEM) injection 5 mg 5 mg Intravenous Q4H PRN   Senna (SENOKOT) tab TABS 17.2 mg 17.2 mg Oral Nightly   ondansetron HCl (ZOFRAN) injection 4 mg 4 mg Intravenous Q6H PRN   thyroid (ARMOUR) tab 90 mg 90 mg Oral Daily   latanoprost (XALATAN) 0.005 % opht

## 2017-08-07 NOTE — CM/SW NOTE
Spoke with Callie hodge, re: [facility] choice. She prefers [facility] since it is close to home. SW noted that pt has [employer] PPO, so will need to make sure facility is in network.     JAMARCUS spoke with Mally Favors at above facility -they do not provide skilled

## 2017-08-07 NOTE — PROGRESS NOTES
[facility] Progress Note    Patient seen and examined.  Chart reviewed.     No chest pain or shortness of breath.     /66 (BP Location: Left arm)   Pulse 121   Temp 98.5 °F (36.9 °C) (Oral)   Resp 14   Ht 5' 4\" (1.626 m)   Wt 176 lb (79.8 kg)   S

## 2017-08-07 NOTE — PROGRESS NOTES
Orthopedic surgery progress note    Gabrielle Ordonez Patient Status:  Inpatient    1929 MRN [id number]   [facility] 3SW-A Attending Jaimie Bolaños MD   Hosp Day # 3 PCP Mitchel Smith MD       Subjective:  No calf pain, lightheadedness,

## 2017-08-07 NOTE — OCCUPATIONAL THERAPY NOTE
OCCUPATIONAL THERAPY EVALUATION - INPATIENT     Room Number: 367/367-A  Evaluation Date: 8/7/2017  Type of Evaluation: Initial  Presenting Problem: s/p L hip IM rodding 8/5/17    Physician Order: IP Consult to Occupational Therapy  Reason for Therapy: ADL/ Risk: High fall risk    WEIGHT BEARING RESTRICTION  Weight Bearing Restriction: L lower extremity           L Lower Extremity: Partial Weight Bearing 50%    PAIN ASSESSMENT  Rating: Unable to rate  Location: L hip with movement  Management Techniques:  Acti Patient received in supine > education on PWB 50% precautions and incorporation into ADLs and functional transfers; patient with fair return demo this session, will benefit from continued reinforcement > sitting EOB to L side via max assist x2, increased t Activities of Daily Living Short Form for the patient is 66.57% degree of basic ADL impairment. Research supports that patients with this level of impairment often benefit from rehab placement.  The patient is below their baseline and would benefit from con

## 2017-08-08 ENCOUNTER — APPOINTMENT (OUTPATIENT)
Dept: GENERAL RADIOLOGY | Facility: HOSPITAL | Age: 82
DRG: 481 | End: 2017-08-08
Attending: INTERNAL MEDICINE
Payer: MEDICARE

## 2017-08-08 VITALS
BODY MASS INDEX: 30.05 KG/M2 | DIASTOLIC BLOOD PRESSURE: 84 MMHG | OXYGEN SATURATION: 95 % | HEIGHT: 64 IN | WEIGHT: 176 LBS | TEMPERATURE: 98 F | SYSTOLIC BLOOD PRESSURE: 142 MMHG | HEART RATE: 84 BPM | RESPIRATION RATE: 16 BRPM

## 2017-08-08 LAB
ALBUMIN SERPL-MCNC: 2.3 G/DL (ref 3.5–4.8)
BASOPHILS # BLD AUTO: 0.06 X10(3) UL (ref 0–0.1)
BASOPHILS NFR BLD AUTO: 0.8 %
BUN BLD-MCNC: 19 MG/DL (ref 8–20)
CALCIUM BLD-MCNC: 8.3 MG/DL (ref 8.3–10.3)
CHLORIDE: 104 MMOL/L (ref 101–111)
CO2: 28 MMOL/L (ref 22–32)
CREAT BLD-MCNC: 0.74 MG/DL (ref 0.55–1.02)
EOSINOPHIL # BLD AUTO: 0.67 X10(3) UL (ref 0–0.3)
EOSINOPHIL NFR BLD AUTO: 8.9 %
ERYTHROCYTE [DISTWIDTH] IN BLOOD BY AUTOMATED COUNT: 13.2 % (ref 11.5–16)
GLUCOSE BLD-MCNC: 91 MG/DL (ref 70–99)
HAV IGM SER QL: 2 MG/DL (ref 1.7–3)
HCT VFR BLD AUTO: 30.8 % (ref 34–50)
HGB BLD-MCNC: 10.2 G/DL (ref 12–16)
IMMATURE GRANULOCYTE COUNT: 0.03 X10(3) UL (ref 0–1)
IMMATURE GRANULOCYTE RATIO %: 0.4 %
INR BLD: 1.81 (ref 0.89–1.11)
LYMPHOCYTES # BLD AUTO: 0.99 X10(3) UL (ref 0.9–4)
LYMPHOCYTES NFR BLD AUTO: 13.1 %
MCH RBC QN AUTO: 30.9 PG (ref 27–33.2)
MCHC RBC AUTO-ENTMCNC: 33.1 G/DL (ref 31–37)
MCV RBC AUTO: 93.3 FL (ref 81–100)
MONOCYTES # BLD AUTO: 0.55 X10(3) UL (ref 0.1–0.6)
MONOCYTES NFR BLD AUTO: 7.3 %
NEUTROPHIL ABS PRELIM: 5.26 X10 (3) UL (ref 1.3–6.7)
NEUTROPHILS # BLD AUTO: 5.26 X10(3) UL (ref 1.3–6.7)
NEUTROPHILS NFR BLD AUTO: 69.5 %
PHOSPHATE SERPL-MCNC: 2 MG/DL (ref 2.5–4.9)
PLATELET # BLD AUTO: 165 10(3)UL (ref 150–450)
POTASSIUM SERPL-SCNC: 3.9 MMOL/L (ref 3.6–5.1)
PSA SERPL DL<=0.01 NG/ML-MCNC: 21.2 SECONDS (ref 12–14.3)
RBC # BLD AUTO: 3.3 X10(6)UL (ref 3.8–5.1)
RED CELL DISTRIBUTION WIDTH-SD: 45.3 FL (ref 35.1–46.3)
SODIUM SERPL-SCNC: 138 MMOL/L (ref 136–144)
WBC # BLD AUTO: 7.6 X10(3) UL (ref 4–13)

## 2017-08-08 PROCEDURE — 71010 XR CHEST AP PORTABLE  (CPT=71010): CPT | Performed by: INTERNAL MEDICINE

## 2017-08-08 PROCEDURE — 99239 HOSP IP/OBS DSCHRG MGMT >30: CPT | Performed by: INTERNAL MEDICINE

## 2017-08-08 RX ORDER — HYDROCODONE BITARTRATE AND ACETAMINOPHEN 5; 325 MG/1; MG/1
1 TABLET ORAL EVERY 4 HOURS PRN
Qty: 40 TABLET | Refills: 0 | Status: SHIPPED | OUTPATIENT
Start: 2017-08-08

## 2017-08-08 RX ORDER — DILTIAZEM HYDROCHLORIDE 240 MG/1
240 CAPSULE, COATED, EXTENDED RELEASE ORAL DAILY
Qty: 30 CAPSULE | Refills: 0 | Status: SHIPPED | OUTPATIENT
Start: 2017-08-08

## 2017-08-08 RX ORDER — WARFARIN SODIUM 2 MG/1
2 TABLET ORAL NIGHTLY
Status: DISCONTINUED | OUTPATIENT
Start: 2017-08-08 | End: 2017-08-08

## 2017-08-08 RX ORDER — METOPROLOL SUCCINATE 50 MG/1
50 TABLET, EXTENDED RELEASE ORAL
Qty: 60 TABLET | Refills: 0 | Status: SHIPPED | OUTPATIENT
Start: 2017-08-08

## 2017-08-08 RX ORDER — WARFARIN SODIUM 2 MG/1
2 TABLET ORAL DAILY
Qty: 30 TABLET | Refills: 0 | Status: SHIPPED | OUTPATIENT
Start: 2017-08-08

## 2017-08-08 NOTE — PROGRESS NOTES
[facility]  Progress Note    Abdon Pierre Patient Status:  Inpatient    1929 MRN [id number]   [facility] 3SW-A Attending Geovanna Salvador MD   Hosp Day # 4 PCP Yessi Beavers MD     Assessment:  · Post-op hypoxemia  · ? COPD- form wheezign   Abdomen: soft, nontender, no guarding, no rebound, positive BS  Extremity: no lower extremity edema, no cyanosis  Neurological:, no new focal deficits    Lab Data Review:    Recent Labs   08/05/17  1634 08/06/17  0421 08/07/17  0616 08/08/17  05 thyroid (ARMOUR) tab 90 mg 90 mg Oral Daily   latanoprost (XALATAN) 0.005 % ophthalmic solution 1 drop 1 drop Both Eyes Nightly   DULoxetine HCl (CYMBALTA) DR particles cap 30 mg 30 mg Oral Daily   ondansetron HCl (ZOFRAN) injection 8 mg 8 mg Intravenous

## 2017-08-08 NOTE — PROGRESS NOTES
[address] Patient Status:  Inpatient    1929 MRN [id number]   [facility] 3SW-A Attending Radha Trevizo MD   Hosp Day # 4 PCP Gisselle Lal MD         S:  Patient doing well. No nausea.   No SOB, CP or calf naz

## 2017-08-08 NOTE — DISCHARGE SUMMARY
[facility]  Discharge Summary    Sravanthi Solano Patient Status:  Inpatient    1929 MRN [id number]   [facility] 3SW-A Attending No att. providers found   Hosp Day # 4 PCP Bacilio Valdovinos MD     Date of Admission: 2017    Date o the intertrochanteric region of the proximal left femur and base of the left femoral neck s/p fall at home- seen by ortho Dr Ann Torres and s/p ORIF on 8/5/2017.   Chronic a fib, CAD- seen by cardiology   Hypothyroidism- on armor thyroid at home  Hyperlipidemia- s hours. Take 1 tablet by mouth every 4 (four) hours as needed for Pain. Quantity:  40 tablet  Refills:  0     Metoprolol Succinate ER 50 MG Tb24  Commonly known as: Toprol XL      Take 1 tablet (50 mg total) by mouth 2x Daily(Beta Blocker).    Early Octaviano Beads 240 MG Cp24  · HYDROcodone-acetaminophen 5-325 MG Tabs  · Metoprolol Succinate ER 50 MG Tb24  · potassium & sodium phosphates 280-160-250 MG Pack  · Warfarin Sodium 2 MG Tabs         Follow up Visits:  Follow-up with Jason Brenner MD in 1 week    [facility] STAN STUART appears normal      Discharge Condition: stable    Patient Discharge Instructions: See electronic chart      More than 30 minutes on discharge    Jameson Nicholas MD  8/8/2017  7:49 PM

## 2017-08-08 NOTE — CM/SW NOTE
Spoke with Jean Paul Royal at [facility] -pt is accepted; she has one bed available for today. Notice received from [facility] that above [facility] has been approved; Rosa Maria Adair #[id number] (8/7-8/9).     Awaiting medical clearance for d/c and will set up am

## 2017-08-08 NOTE — CM/SW NOTE
Informed by RN that pt is medically cleared for d/c today to [facility]. Discussed 5:30PM d/c time. JAMARCUS contacted [facility] in [address]. Pt is accepted with above d/c time.   [facility]    Rishi Collado amb

## 2017-08-08 NOTE — PROGRESS NOTES
[facility] Progress Note    Patient seen and examined.  Chart reviewed.      No chest pain or shortness of breath. Does have occasional pain in the left hip, but overall pain well controlled. /48 (BP Location: Right arm)   Pulse 90   Temp 98.

## 2017-08-08 NOTE — PROGRESS NOTES
[facility]  Progress Note    Neelima Stephenson Patient Status:  Inpatient    1929 MRN [id number]   [facility] 3SW-A Attending Meredith Morales MD   Hosp Day # 4 PCP Hong Albarado MD     CC:  Left hip pain s/p fall at home    [address] 08/08/2017   BUN 19 08/08/2017    08/08/2017   K 3.9 08/08/2017    08/08/2017   CO2 28.0 08/08/2017   GLU 91 08/08/2017   CA 8.3 08/08/2017   ALB 2.3 08/08/2017   INR 1.81 08/08/2017   PTP 21.2 08/08/2017   MG 2.0 08/08/2017   PHOS 2.0 08/08/20 FINDINGS:  Stable cardiomegaly. Hyperexpansion of the lungs. Stable elevation of the right hemidiaphragm. Stable mild interstitial opacities which may represent chronic interstitial changes versus mild edema. . No significant pleural effusions.    Atheroma intertrochanteric region of the proximal left femur and base of the left femoral neck s/p fall at home- seen by ortho Dr Yobany Antonio and s/p ORIF   2. Chronic a fib, CAD- seen by cardiology   3. Hypothyroidism- on armor thyroid at home  4.  Hyperlipidemia- statin a

## 2017-08-08 NOTE — PHYSICAL THERAPY NOTE
PHYSICAL THERAPY TREATMENT NOTE - INPATIENT    Room Number: 367/367-A     Session: 2  Number of Visits to Meet Established Goals: 5    Presenting Problem: L hip fx s/p IM smith 8/5/17     History related to current admission: Pt admitted from home after fal currently have. ..  -   Turning over in bed (including adjusting bedclothes, sheets and blankets)?: A Lot   -   Sitting down on and standing up from a chair with arms (e.g., wheelchair, bedside commode, etc.): A Little   -   Moving from lying on back to sit and one CGA. THERAPEUTIC EXERCISES  Lower Extremity Ankle pumps  Glut sets  Hip AB/AD  Heel slides  Quad sets  SAQ     Upper Extremity      Position Supine     Repetitions   12   Sets   1     Patient End of Session: Up in chair; With [facility] staff;Needs met;C 8/8/17

## 2017-08-09 NOTE — PLAN OF CARE
This RN spoke with Dr. Nivia Closs regarding discharge planning. [id number] [address] Carlos from Pulmonology standpoint for pt to be discharged to [place] [address] today. Wean O2 per NC as pt tolerates at [facility].

## 2017-08-09 NOTE — CM/SW NOTE
08/09/17 0800   Discharge disposition   Discharged to: Skilled Nurs   Name of Facillity/Home Care/Hospice ([facility] in [place])   Patient is Discharged to a [address] [place] [address] Yes   Discharge transportation [employer]   d/c 8/8

## 2017-08-16 NOTE — PAYOR COMM NOTE
--------------  DISCHARGE REVIEW    Payor: BC[employer] MEDICARE ADV PPO  Subscriber #:  [id number]  Authorization Number: [id number]    Admit date: 8/4/17  Admit time:  2238  Discharge Date: 8/8/2017  6:15 PM     Admitting Physician: Bea Maldonado MD  Attendi falls, has been feeling dizzy and weak. Today while smelling a jesse she lost her balance and fell. Patient presents with left hip pain, 10/10. No chest pain or shortness of breath.  Patient uncomfortable and unable to provide details at this time.  [facility] on 8/5/2017    Incidental or significant findings and recommendations (brief descriptions):  • none    Lab/Test results pending at Discharge:   · none    Discharge Medications:        Discharge Medications      START taking these medications      Instructi aspirin 81 MG Tabs        Metoprolol Tartrate 50 MG Tabs  Commonly known as:  LOPRESSOR            Where to Get Your Medications      Please  your prescriptions at the location directed by your doctor or nurse    Bring a paper prescription for eac rhythm  Abdomen: soft, non-tender; bowel sounds normal  Extremities: extremities normal,no cyanosis or edema; left hip thigh incision in dressing  Pulses: 2+ and symmetric  Skin: Left hip  thigh with incision in dressing  Neurologic: Awake,alert,nonfocal

## 2017-08-25 PROBLEM — S72.142A INTERTROCHANTERIC FRACTURE OF LEFT HIP (HCC): Status: ACTIVE | Noted: 2017-08-25

## (undated) DEVICE — STERILE HOOK LOCK LATEX FREE ELASTIC BANDAGE 6INX5YD: Brand: HOOK LOCK™

## (undated) DEVICE — DRAPE C-ARM UNIVERSAL

## (undated) DEVICE — LOWER EXTREMITY CDS-LF: Brand: MEDLINE INDUSTRIES, INC.

## (undated) DEVICE — 3M™ STERI-DRAPE™ U-DRAPE 1015: Brand: STERI-DRAPE™

## (undated) DEVICE — SOL  .9 3000ML

## (undated) DEVICE — ZIMMER® STERILE DISPOSABLE TOURNIQUET CUFF WITH PLC, DUAL PORT, SINGLE BLADDER, 34 IN. (86 CM)

## (undated) DEVICE — KENDALL SCD EXPRESS SLEEVES, KNEE LENGTH, MEDIUM: Brand: KENDALL SCD

## (undated) DEVICE — PADDING CAST SOFT ROLL 4\"

## (undated) DEVICE — OCCLUSIVE GAUZE STRIP OVERWRAP,3% BISMUTH TRIBROMOPHENATE IN PETROLATUM BLEND: Brand: XEROFORM

## (undated) DEVICE — SOL  .9 1000ML BTL

## (undated) DEVICE — GUIDEWIRE ORTH 100CM 3MM TIB

## (undated) DEVICE — ZNN CMN LAG SCREW 10.5X85
Type: IMPLANTABLE DEVICE | Site: HIP | Status: NON-FUNCTIONAL
Brand: ZIMMER® NATURAL NAIL® SYSTEM
Removed: 2017-08-05

## (undated) DEVICE — STERILE POLYISOPRENE POWDER-FREE SURGICAL GLOVES: Brand: PROTEXIS

## (undated) DEVICE — VIOLET BRAIDED (POLYGLACTIN 910), SYNTHETIC ABSORBABLE SUTURE: Brand: COATED VICRYL

## (undated) DEVICE — BIT DRL 4.3MM NTR NL LNG CLBRT

## (undated) DEVICE — SUTURE VICRYL 2-0 CP-1

## (undated) DEVICE — DRESSING AQUACEL AG 3.5X3.75

## (undated) DEVICE — SHEET,DRAPE,70X100,STERILE: Brand: MEDLINE

## (undated) DEVICE — DRESSING AQUACEL AG 3.5 X 6

## (undated) DEVICE — GAUZE SPONGES,12 PLY: Brand: CURITY

## (undated) DEVICE — PROXIMATE SKIN STAPLERS (35 WIDE) CONTAINS 35 STAINLESS STEEL STAPLES (FIXED HEAD): Brand: PROXIMATE

## (undated) DEVICE — 3M™ IOBAN™ 2 ANTIMICROBIAL INCISE DRAPE 6648EZ: Brand: IOBAN™ 2

## (undated) NOTE — IP AVS SNAPSHOT
Patient Demographics     Address  [address] [place] [address] [facility] [address] Phone  [phone] [facility])  [phone] (Mobile) *Preferred* E-mail Address  [email or web address]      Emergency Contact(s)     Name Relation Home Work Mobile    sherman Todd Daughter 6 ? Usually allowed after four to six weeks. Discuss specific work activities with your surgeon    No smoking  ? Avoid smoking. It is known to cause breathing problems and can decrease the rate of healing. Incision care/Dressing changes  ?  Wash hands befo ? Keep pain manageable; pain should not disrupt your sleep or activities like getting out of bed or walking. ? You may need pain medication regularly (every 4-6 hours) the first 2 weeks and then begin to decrease how often you are taking it.   ? Take pain ? Do not allow others or pets to touch your incision. ? Avoid people that have colds or the flu. ? Your surgeon may recommend that you take antibiotics before you undergo any dental or other invasive surgical procedures after your joint replacement.  Tatiana than incision site). Go directly to the ER or CALL 911 if  you:  ? become short of breath  ? have chest pain  ? cough up blood  ? have unexplained anxiety with breathing       Traveling and Handicapped parking  ?  Check with you surgeon when you Bronson Lerma. Schedule an appointment as soon as possible for a visit in 3 days.     Specialty:  Family Medicine  Why:  After rehab  Contact information:  [address] [facility] [phone]             Schedule an appointment as soon as caleb latanoprost 0.005 % Soln  Commonly known as:  XALATAN  Next dose due:  8/8/17 at 9 pm      nightly. Metoprolol Succinate ER 50 MG Tb24  Commonly known as:   Toprol XL  Next dose due:  8/9/17 at 9 am      Take 1 tablet (50 mg total) by mouth 2x Patience [id number] Metoprolol Succinate ER (Toprol XL) 24 hr tab 50 mg 08/08/17 1746 Given      [id number] Senna (SENOKOT) tab TABS 17.2 mg 08/07/17 2035 Given      [id number] docusate sodium (COLACE) cap 100 mg 08/07/17 2034 Given      [id number] docusate sodium (CO CO2 28.0 22.0 - 32.0 mmol/L [facility]            PHOSPHORUS [[id number]] (Abnormal)  Resulted: 08/08/17 3234, Result status: Final result   Ordering provider:  Miguel A Rasheed MD  08/07/17 2300 Resulting lab:  [facility]    Specimen Information ---------                               -----------         ------                     CBC W/ DIFFERENTIAL[[id number]]          Abnormal            Final result                 Please view results for these tests on the individual orders.     [facility] Day #[PT.1] 0[PT. 2] PCP[PT.1] Jason Brenner MD[PT.2]     Chief Complaint: Fall    History of Present Illness:[PT.1] Moraima Finley[PT. 2] is a[PT.3 80year old[PT.2] female with Atrial fibrillation on Coumadin, CAD sp PCI,[PT.1] Essential hypertensi Review of Systems:   A comprehensive 14 point review of systems was completed. Pertinent positives and negatives noted in the HPI.     Physical Exam:[PT.1]    /60   Pulse 90   Temp 98.1 °F (36.7 °C) (Temporal)   Resp 18   Ht 5' 4\" (1.626 m)   Wt 1 Quality:  · DVT Prophylaxis:[PT.1] Per Ortho, SCDs[PT. 3]    Plan of care discussed with[PT.1] patient, family and ER team.[PT.3]    Angelita Yuan MD  8/4/2017[PT.1]            Electronically signed by David Hernandez MD on 8/4/2017 10:17 PM   Attribution • Thyroid disease Other    • Ear Problems Other    • Allergies Other       reports that she has quit smoking. She does not have any smokeless tobacco history on file. She reports that she does not use drugs. [SG.2]    Allergies:[SG.1]  No Known Allergies[SG dyspnea on exertion, or stridor. Cardiovascular: Negative for chest pain, palpitations, irregular heart beats, syncope, fatigue, orthopnea, paroxysmal nocturnal dyspnea, lower extremity edema.   Gastrointestinal: Negative for dysphagia, odynophagia, nausea (1.626 m) 176 lb (79.8 kg)     4L NC    Physical Exam:   General: alert, cooperative,[SG.1] mild confusion[SG.3]. No respiratory distress. Head: Normocephalic, without obvious abnormality, atraumatic. Eyes: Conjunctivae/corneas clear.   No scleral icte · Hypothyroidism- on replacement therapy      Plan:  · Suspect post-op atelectasis and shallow breathing due to anesthesia.  Recommend pulmonary toilet, OOB when ok'd by surgery, IS  · Check CXR[SG.1] and ABG now[SG.3]  · Oxygen protocol, wean as tolerated[ Hypothyroidism    Atelectasis    Hypoxia      Past Medical History  Past Medical History:   Diagnosis Date   [facility]    • Allergic rhinitis    • Arrhythmia    • Arthritis    • Disorder of thyroid    • GERD (gastroesophageal reflux disease)    • Hea Gait Assistance: Dependent assistance  Distance (ft): 1  Assistive Device: Rolling walker  Pattern:  (R foot scoot/shuffle and PWB on LLE)  Stoop/Curb Assistance: Not tested       Skilled Therapy Provided: The pt received in supine.  Pt needed mod assist to below their baseline and would benefit from continued skilled PT to address the activity limitations identified by the AM-PAC \"6 clicks\".      At this time, Pt. presents with decreased balance, impaired strength, difficulty with gait/transfers resulting i on 8/4. Per chart, pt has had freq falls lately accompanied by c/o dizziness and feeling weak. Pt dx with L hip fx and had surgery with IM smith on 8/5/17.   Post op t/f to ICU for observation and need for 4L NC.[BR.2]      Problem List  Principal Problem: How much help from another person does the patient currently need. ..   -   Moving to and from a bed to a chair (including a wheelchair)?: A Lot   -   Need to walk in hospital room?: Total   -   Climbing 3-5 steps with a railing?: Total       AM-PAC Score: Results of the AM-PAC \"6 clicks\" Inpatient Daily Mobility Short Form for the patient is[BR.3] 76. 75[BR.2]% degree of basic mobility impairment.   The patient is below their baseline and would benefit from continued skilled PT to address the activity limit Type of Evaluation: Initial  Physician Order: PT Eval and Treat    Presenting Problem: L hip fx s/p IM smith 8/5/17  Reason for Therapy: Mobility Dysfunction and Discharge Planning    History related to current admission: Pt admitted from home after falling Rating: Unable to rate  Location: L leg/hip  Management Techniques: Breathing techniques    COGNITION  · Overall Cognitive Status:  Impaired  · Arousal/Alertness:  delayed responses to stimuli  · Following Commands:  follows one step commands with increase FUNCTIONAL ABILITY STATUS  Gait Assessment   Gait Assistance: Not tested           Stoop/Curb Assistance: Not tested       Skilled Therapy Provided: Pt received sleeping in bed. Easy to arouse and very [place].   Sup > sit with HOB elevated to 30 deg with max The patient is below her baseline and would benefit from skilled inpatient PT to address the above deficits to assist patient in returning to prior level of function.     Level of complexity is low for PT eval.    DISCHARGE RECOMMENDATIONS  PT Discharge Rec Principal Problem:    Hip fracture, left, closed, initial encounter [facility])  Active Problems:    Hip fracture, left ([address]    Essential hypertension    Fall, initial encounter    Atrial fibrillation, chronic ([facility]    Anticoagulated on Coumadin    Hypothyroidi Following Commands:  follows one-step commands inconsistently, follows one step commands with increased time and follows one step commands with repetition[BW.2]    Communication:[BW.1] WFL[BW.3]    Behavioral/Emotional/Social:[BW.1] Requires things be take poor plus balance; patient requiring assist to scoot forward to place feet on floor, also requiring increased time due to pain and fear of falling > standing via RW, mod assist x2, cueing for PWB and max cueing > several steps to bedside commode via mod as continued skilled OT to address the activity limitations identified by the AM-PAC \"6 clicks\". Subacute rehab is recommended for 19-21 days.  After this period of rehabilitation patient should achieve supervision to Mod I level in all BADLs and functional

## (undated) NOTE — IP AVS SNAPSHOT
[address]            (For Outpatient Use Only) Initial Admit Date: 8/4/2017   Inpt/Obs Admit Date: Inpt: 8/4/17 / Obs: N/A   Discharge Date:    Liliana Vernon:  [de-identified]   MRN: [de-identified]   CSN: [id number]        [id number] Hospital Account Financial Class: Medicare Advantage    August 8, 2017

## (undated) NOTE — LETTER
[facility]  Fely Garg 61 [id number] [facility], [address] [place] [address]    Consent for Operation    Date: __________________    Time: _______________    1.  I authorize the performance upon Taylor Catching the following operation:    Procedure(s):  Left Hip Intramedu videotape. [facility] will not be responsible for storage or maintenance of this tape. 6. For the purpose of advancing medical education, I consent to the admittance of observers to the Operating Room.     7. I authorize the use of any specimen, organs Signature of Patient:   ___________________________    When the patient is a minor or mentally incompetent to give consent:  Signature of person authorized to consent for patient: ___________________________   Relationship to patient: _____________________ drugs/illegal medications). Failure to inform my anesthesiologist about these medicines may increase my risk of anesthetic complications. · If I am allergic to anything or have had a reaction to anesthesia before.     3. I understand how the anesthesia med I have discussed the procedure and information above with the patient (or patient’s representative) and answered their questions. The patient or their representative has agreed to have anesthesia services.     _______________________________________________

## (undated) NOTE — LETTER
[facility]  Fely Garg 61 [address] [facility], [address] [place] [address]    Consent for Operation    Date: __________________    Time: _______________    1.  I authorize the performance upon Sravanthi Solano the following operation:    Procedure(s):  RIGHT HIP [facility] videotape. [facility] will not be responsible for storage or maintenance of this tape. 6. For the purpose of advancing medical education, I consent to the admittance of observers to the Operating Room.     7. I authorize the use of any specimen, organs Signature of Patient:   ___________________________    When the patient is a minor or mentally incompetent to give consent:  Signature of person authorized to consent for patient: ___________________________   Relationship to patient: _____________________ drugs/illegal medications). Failure to inform my anesthesiologist about these medicines may increase my risk of anesthetic complications. · If I am allergic to anything or have had a reaction to anesthesia before.     3. I understand how the anesthesia med I have discussed the procedure and information above with the patient (or patient’s representative) and answered their questions. The patient or their representative has agreed to have anesthesia services.     _______________________________________________